# Patient Record
(demographics unavailable — no encounter records)

---

## 2024-10-17 NOTE — HISTORY OF PRESENT ILLNESS
[FreeTextEntry1] : She has been Seeing a functional and regenerative specialist ( Dr. Finnegan), who weaned her off Midodrine and decreased keppra and inderal. Her tremors are breakthrough more in the evenings and can interfere with cooking. When working the tremors can emerge during fine motor movements.  She had few falls/near falls where she tripped or lost her balance.  Reports pain and numbness in both feet that can radiate from her lumbar spine down her right leg. Has a hx of sciatic neuropathy and follows with Dr. Templeton    Current meds: Inderal 10 mg in am, and another 20 PRN Keppra 250 mg in am and 125 mg in pm primidone 250+250  Gabapentin 300mg QHS clonazepam 0.25 in am and 0.5 in pm melatonin 15 mg QHS   Prior med: Midodrine Sertraline Seroquel  PMHx: Depression

## 2024-10-17 NOTE — DISCUSSION/SUMMARY
[FreeTextEntry1] : This is a 70-year-old female who suffered a ruptured viscus requiring emergency surgery that was complicated by cardiopulmonary arrest and seizure. She has residual hand tremors that appear myoclonic in nature and likely of a cortical etiology. Reduction in keppra and inderal likely causing evening breakthrough.   Patient was counseled on the following recommendations: Recommend taking Keppra 250 mg in the morning and the afternoon Leave inderal regimen the same  Will consider decreasing Primidone in the future Follow up with Dr. Templeton for neuropathy.  f/u 4-6months  Patient is seen and discussed with Dr. Seth GIPSON Movement Disorder Fellow

## 2024-10-17 NOTE — PHYSICAL EXAM
[FreeTextEntry1] : There is no facial masking. Extraocular movements are intact. Speech is normal. There are no tremors at rest in her limbs or when outstretched There is no KT.  There is no bradykinesia or rigidity. She has a mildly antalgic gait. Her spirals reveal interrupted jerky waveforms that are not indicative of essential tremor. There are no dystonic postures.  Has wide base of station when walking with some veering

## 2024-10-17 NOTE — END OF VISIT
[] : Fellow [Time Spent: ___ minutes] : I have spent [unfilled] minutes of time on the encounter which excludes teaching and separately reported services. [FreeTextEntry3] : Edits made to HPI, exam and A/P

## 2025-02-12 NOTE — ASSESSMENT
[FreeTextEntry1] : Mrs. Luciano is a 71year-old who suffered a major illness in February 2022 complicated by cardiopulmonary arrest and convulsive activity.  She has probably right lower extremity symptoms which may be residual from a sciatic nerve injury.  There was no obvious compressive sciatic nerve lesion in the lumbar spine or pelvis.  She was told that her right hip replacement pain is out of place.  I am impressed by her motor dysfunction.  She is bradykinetic, a bit dysmetric and ataxic.  I again raise suspicion that her symptoms might be due to parkinsonism.  She will now agree an F dopa PET scan.  She will also agree to undergo a whole genome study.  Further management will depend upon these results and her clinical course.  I provided her with a prescription for physical therapy.

## 2025-02-12 NOTE — HISTORY OF PRESENT ILLNESS
[FreeTextEntry1] : Mrs. Florencia Luciano returned to the office having been last seen on 2024.  She is a 71-year-old right-handed patient was well until February 15, 2022 when she was hospitalized at [facility] with a ruptured viscus requiring surgery.  She was critically ill and sustaining cardiopulmonary arrest which was complicated by seizure activity.  She was treated with tapering doses of lacosamide since that time without seizure recurrence.  Since that critical illness, she had experienced incapacitating tremors of her hands.  She was treated with primidone without improvement.  On 2022, she suffered a fall fracturing her right hip requiring surgical repair.  Since that procedure, she has had numbness of her right lateral foot and calf and increasing low back pain and stiffness.  MRI of the brain performed in 2022 was unremarkable.  A routine EEG was normal.  MRI of the lumbar spine performed at  on 2022 revealed a partially imaged chronic appearing moderate T11 vertebral body fracture.  There was a chronic appearing mild L1 vertebral body compression fracture versus Schmorl's node.  There is a partially imaged chronic appearing S2 vertebral compression fracture deformity.  There were partially imaged age-indeterminate bilateral sacral giovanni fractures.  There was a partially imaged severe distal right L5 this S1 neuroforaminal stenosis possibly related to transitional anatomy and right sacral fracture deformity.  There was a small L3-4 disc herniation.  Upon review of my initial consultation, I commented that her right lower extremity sensory and motor complaints are consistent with either L5 and S1 nerve root dysfunction or sciatic neuropathy.  For some reason, I did not appreciate the above mentioned neuroforaminal stenosis at L5-S1.  I suggested retrieval of Dr. Salas's EMG and nerve conduction study to review.  I did not receive those records.  She was recently seen by Dr. Tony Ann, a movement disorder specialist.  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  She reports that her limb jerking is improved.  At her 2023 visit, her major complaint was tightness in her right calf and foot.  She complained of tingling in both buttocks without back pain.  Her symptoms worsened with ambulation.  EMG and nerve conduction studies revealed evidence of a sensory polyneuropathy.  There was electrophysiologic evidence of a predominantly chronic right L5 and S1 segmental motor injury most notably affecting the peroneal division/nerve.  It was not possible to precisely localize the injury to the sciatic nerve or its branches, lumbosacral plexus or lumbar roots.  MRIs of the pelvis and lumbar spine revealed no evidence of compression of the L5 or S1 nerve root or sciatic nerve.  At her 2024 visit, Mrs. Luciano complained of worsening fine motor skills.  She complained of action tremor.  She had pain between her first and second fingers bilaterally.  She had difficulty writing.  Her right foot was cold and painful.  Her left distal foot had become numb.  She had only occasional low back pain.  She appeared parkinsonian.  I was uncertain whether this was neuroleptic related or possibly idiopathic Parkinson's disease.  An F dopa PET scan was recommended but never performed.  She did undergo a comprehensive serologic evaluation including some genetic testing.  No obviously pathogenic variants were discovered.  Her reports are very similar to those in the past.  She complained of bilateral upper posterior thigh pins-and-needles.  She complained of pins-and-needles in her right foot and discomfort in her lateral right thigh.  Her right leg felt weak.  She was ambulating independently.  Her handwriting was worse.  Mr. Luciano reported that she talked in her sleep.  Past surgical history is notable for her mentioned abdominal and right hip procedures.  She is status post  section and bilateral mastectomies and reconstruction for dysplasia.  She suffered from hyperlipidemia.  There is no history of hypertension, diabetes, cardiac, pulmonary, renal, hepatic, other gastrointestinal, thyroid, hematologic or cerebrovascular disease.  She has allergies to sulfa, aspirin, niacin and latex.  Medications include folic acid, thiamine, vitamin B12, clonazepam 0.75 mg/day divided doses, omeprazole, melatonin 5 mg at bedtime, gabapentin 300 mg 3 times a day, oxcarbazepine 900 mg at bedtime, primidone 100 mg twice a day, trazodone 200 mg at bedtime, [place] Thyroid, propranolol 30 mg/day and levetiracetam 250 mg twice a day.  She is a non-smoker and nondrinker.  She is  and works as a skin specialist.  Family history is notable for breast cancer, kidney disease and hypertension.

## 2025-02-12 NOTE — CONSULT LETTER
[Dear  ___] : Dear  [unfilled], [Consult Letter:] : I had the pleasure of evaluating your patient, [unfilled]. [Please see my note below.] : Please see my note below. [Consult Closing:] : Thank you very much for allowing me to participate in the care of this patient.  If you have any questions, please do not hesitate to contact me. [Sincerely,] : Sincerely, [FreeTextEntry3] : Zia Templeton MD\par   [DrYarelis  ___] : Dr. GARCIA

## 2025-02-12 NOTE — PHYSICAL EXAM
[FreeTextEntry1] : Constitutional:  Patient was well-developed, well-nourished and in no acute distress.   Head:  Normocephalic, atraumatic. Tympanic membranes were not examined.   Neck:  Supple with full range of motion.   Cardiovascular:  Cardiac rhythm was regular without murmur. There were no carotid bruits. Peripheral pulses were full and symmetric.   Respiratory:  Lungs were clear.   Abdomen:  Soft and nontender.   Spine:  Nontender.  There was no pain on straight leg raising.  Joe's maneuver was negative.  Skin:  There were no rashes.   NEUROLOGICAL EXAMINATION:  Mental Status: Patient was alert and oriented. Speech was fluent. There was no dysarthria.  There was no significant vocal tremor.  She was mildly hypophonic.  Handwriting was micrographic.  Cranial Nerves:   II: She could finger count bilaterally. Pupils were equal and reactive. Visual fields were full.  Fundi were not visualized.  III, IV, VI:  Eye movements were full without nystagmus.   V: Facial sensation was intact.   VII: Facial strength was normal.  There was mild facial masking.  VIII: Hearing was equal.   IX, X: Palatal movement was normal. Phonation was normal.   XI: Sternocleidomastoids and trapezii were normal.   XII: Tongue was midline and movements slow. There was no lingual atrophy or fasciculations.   Motor Examination: Muscle bulk, tone and strength were normal.  She was able to stand on her toes and heels without difficulty.  There was no tremor.  Fine finger and rapid alternating movements were slow.  She held her right ankle and foot internally rotated.  Sensory Examination: Pinprick, vibration and joint position sense were intact except for decreased pinprick and light touch on the right foot dorsum, sole and right lateral and posterior calf.   Reflexes: DTRs were 1 in the upper extremities, 1+ at the knees and absent at the ankles.  Plantar Responses: Plantar responses were flexor.   Coordination/Cerebellar Function: There was no dysmetria on finger to nose testing.  There was bilateral right more than left heel-to-shin dysmetria.  Gait/Stance: Posture was stooped and armswing decreased.  She was scoliotic.  Turns were bit decomposed.  Tandem was unsteady.

## 2025-05-30 NOTE — PHYSICAL EXAM
[FreeTextEntry1] :  There is no facial masking. Extraocular movements are intact. Speech is normal. There are mild myoclonic tremors in her hands when outstretched. Her spirals reveal interrupted jerky waveforms. There are no dystonic postures. Marisela are normal in upper extremities and reduced in amplitude in LE. Tone is nl. Has wide base of station when walking with some veering.

## 2025-05-30 NOTE — HISTORY OF PRESENT ILLNESS
[FreeTextEntry1] : Patient reports that right foot continues to feel like cement. Hand tremors are stable but can breakthrough with fine motor activities. Overall tremors are not affecting QOL as much with keppra and inderal regimen.  She feels that she is having more difficulty walking and notes that right hip pain where she had a screw and plate placed is more noticeable when she ambulates. . She fell 7 weeks ago and broke her right shoulder after tripping on a step. She is currently doing PT for it.  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

## 2025-05-30 NOTE — DISCUSSION/SUMMARY
[FreeTextEntry1] : his is a 70-year-old female who suffered a ruptured viscus requiring emergency surgery that was complicated by cardiopulmonary arrest and seizure. She has residual hand tremors that appear myoclonic in nature and likely of a cortical etiology.   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 Her SCA 35 mutation and suggests a genetic component may likely be driving her ataxia especially with evidence of CBL atrophy on MRI. I agree with Dr. Templeton's recommendation to do a Fdopa scan at this time. If abnormal, a trial of l-dopa is warranted. She was encouraged to increase her PT and home exercises. Will followup after FDOPA results are back.